# Patient Record
Sex: FEMALE
[De-identification: names, ages, dates, MRNs, and addresses within clinical notes are randomized per-mention and may not be internally consistent; named-entity substitution may affect disease eponyms.]

---

## 2021-07-20 ENCOUNTER — TRANSCRIPTION ENCOUNTER (OUTPATIENT)
Age: 66
End: 2021-07-20

## 2021-07-20 ENCOUNTER — APPOINTMENT (OUTPATIENT)
Dept: OTOLARYNGOLOGY | Facility: CLINIC | Age: 66
End: 2021-07-20
Payer: MEDICARE

## 2021-07-20 DIAGNOSIS — Z86.39 PERSONAL HISTORY OF OTHER ENDOCRINE, NUTRITIONAL AND METABOLIC DISEASE: ICD-10-CM

## 2021-07-20 DIAGNOSIS — J34.2 DEVIATED NASAL SEPTUM: ICD-10-CM

## 2021-07-20 DIAGNOSIS — Z78.9 OTHER SPECIFIED HEALTH STATUS: ICD-10-CM

## 2021-07-20 DIAGNOSIS — R04.0 EPISTAXIS: ICD-10-CM

## 2021-07-20 PROBLEM — Z00.00 ENCOUNTER FOR PREVENTIVE HEALTH EXAMINATION: Status: ACTIVE | Noted: 2021-07-20

## 2021-07-20 PROCEDURE — 30901 CONTROL OF NOSEBLEED: CPT | Mod: 59,LT

## 2021-07-20 PROCEDURE — 99203 OFFICE O/P NEW LOW 30 MIN: CPT | Mod: 25

## 2021-07-20 PROCEDURE — 31231 NASAL ENDOSCOPY DX: CPT

## 2021-07-20 RX ORDER — ATORVASTATIN CALCIUM 40 MG/1
40 TABLET, FILM COATED ORAL
Refills: 0 | Status: ACTIVE | COMMUNITY

## 2021-07-20 NOTE — CONSULT LETTER
[Dear  ___] : Dear  [unfilled], [Consult Letter:] : I had the pleasure of evaluating your patient, [unfilled]. [Please see my note below.] : Please see my note below. [Consult Closing:] : Thank you very much for allowing me to participate in the care of this patient.  If you have any questions, please do not hesitate to contact me. [Sincerely,] : Sincerely, [FreeTextEntry3] : Shilpa Dahl MD\par

## 2021-07-20 NOTE — ASSESSMENT
[FreeTextEntry1] : She has had recurrent left anterior epistaxis. A bleeding source on the left anterior nasal septum was cauterized. She tolerated it well. She had a deviated septum. There were no suspicious masses or lesions\par \par Plan\par -Findings and management options were discussed with the patient.\par - Literature for epistaxis management given\par - Patient told to avoid heavy lifting, bending, straining, nose blowing and nasal manipulation\par - Avoid aspirin, NSAIDS if able to\par - Humidifier recommended\par - Nasal saline spray PRN \par - Moisturizing gel or bacitracin b.i.d.for one week and then a moisturizing nasal gel as needed\par - Patient to go to ER if there is bleeding that cannot be controlled\par - follow up in approximately 2 weeks\par - call and return earlier if any concerns or recurrent bleeding\par

## 2021-07-20 NOTE — HISTORY OF PRESENT ILLNESS
[de-identified] : KISHA LAU is a 65 year patient referred by Dr. Griggs for a history of recurrent left epistaxis. She has had several episodes over the past week. The last episode was on Saturday. The episodes have been heavy. They have taken several minutes to stop with pressure. She did not have any preceding event. She denies nasal trauma, upper respiratory tract infection or allergies. She has no history of nasal trauma or nasal surgery. She denies nasal manipulation. She said that laboratory testing to rule out coagulopathy is pending